# Patient Record
(demographics unavailable — no encounter records)

---

## 2025-07-09 NOTE — CONSULT LETTER
[Dear  ___] : Dear  [unfilled], [Courtesy Letter:] : I had the pleasure of seeing your patient, [unfilled], in my office today. [Please see my note below.] : Please see my note below. [Consult Closing:] : Thank you very much for allowing me to participate in the care of this patient.  If you have any questions, please do not hesitate to contact me. [Sincerely,] : Sincerely, [FreeTextEntry3] :     Cady Lopes PA-C, MSPAS  [DrFox  ___] : Dr. FERNANDES

## 2025-07-09 NOTE — ASSESSMENT
[FreeTextEntry1] : Alcides presents back to the office 1 month after the repair of  incarcerated right inguinal hernia repair with mesh, a left inguinal hernia repair with mesh and an incarcerated umbilical hernia repair with mesh complaining of persistent pain in the right groin, sometimes feeling sharp or tingling. He has not returned to his previous activities yet, and is very conservative with his movement.   Physical examination demonstrates a well-healed scar with no evidence of hernia recurrence or delayed wound complications in left groin or umbilical region. He does have some mild tenderness to deep palpation overlying the right inguinal canal with no evidence of hernia or fascial defect or delayed wound complications. There is no evidence of muscular hematoma or any significant abdominal pathology. His abdomen is soft and nontender with no signs of peritonitis. His weight has remained stable and his current BMI is 35. Alcides was counseled and reassured. I believe he is experiencing some right inguinal nerve irritation and I recommended ice therapy and nonsteroidal anti-inflammatory medication for the next few weeks, and return to his strenuous physical activity whenever possible. He was encouraged to return to me in the future if these symptoms persist or worsen, of course.